# Patient Record
(demographics unavailable — no encounter records)

---

## 2025-06-18 NOTE — HISTORY OF PRESENT ILLNESS
[FreeTextEntry1] : LEROY MATUTE Oct 19 1956   Language: English Date of First visit: 06/18/2025 Accompanied by: self Contact info: Referring Provider/PCP: Dr. Ramses Monson Fax: [phone]   CC/ Problem List:  LUTs =============================================================================== FIRST VISIT / Summary: Very pleasant 68 year old M here for pain in bladder if hold urine too long and urinary urgency with some leakage. Daytime frequency 6-8x and nocturia 3x. Drinks 2-3 cups water daily, 1 cup coffee, sometimes spicy, eats acidic foods, eats citrus foods. very rarely carbonated beverages. Snores no leg swelling  Denies hematuria or dysuria or constipation  IPSS QOL  ------------------------------------------------------------------------------------------- INTERVAL VISITS:   ===============================================================================   PMH: DM, HTN  FHx: no  malignancies  SocHx: non smoker, no Etoh, ,    PSH: none    ROS: Review of Systems is as per HPI unless otherwise denoted below   =============================================================================== DATA: LABS (SELECTED):---------------------------------------------------------------------------------------------------  6/9/2025 Cr 0.96, A1c 6.3, PSA 0.90   RADS:------------------------------------------------------------------------------------------------------------------- 6/11/2025    PATHOLOGY/CYTOLOGY:-------------------------------------------------------------------------------------------     VOIDING STUDIES: ----------------------------------------------------------------------------------------------------     STONE STUDIES: (Analysis/LLSA)----------------------------------------------------------------------------------     PROCEDURES: -----------------------------------------------------------------------------------------------       =============================================================================== PHYSICAL EXAM:    FOCUSED: ---------------------------------------------------------------------------------------------------------------- 06/18/2025 TOBY declined   ======================================================================================= DISCUSSION: ======================================================================================= ASSESSMENT and PLAN   1. LUTs - increase hydration to 2-3L daily - avoid bladder irritants - prescribed Tamsulosin, SE discussed  - uroflow at next visit  - RTC in 2-3 months - traveling july 14 for 3 weeks   2. snores -home sleep test ordered   =======================================================================================  4g Thank you for allowing me to assist in the care of your patient. Should you have any questions please do not hesitate to reach out to me.     Larry Hodge MD                                                       [facility] Physician [facility]   [place] Office: [address], [address] [place] [address] T: [phone] F: [phone]   [place] Office: [address] [place] [address] T: [phone] F: [phone]

## 2025-06-18 NOTE — HISTORY OF PRESENT ILLNESS
[FreeTextEntry1] : LEROY MATUTE Oct 19 1956   Language: English Date of First visit: 06/18/2025 Accompanied by: self Contact info: Referring Provider/PCP: Dr. Ramses Monson Fax: [phone]   CC/ Problem List:  LUTs =============================================================================== FIRST VISIT / Summary: Very pleasant 68 year old M here for pain in bladder if hold urine too long and urinary urgency with some leakage. Daytime frequency 6-8x and nocturia 3x. Drinks 2-3 cups water daily, 1 cup coffee, sometimes spicy, eats acidic foods, eats citrus foods. very rarely carbonated beverages. Snores no leg swelling  Denies hematuria or dysuria or constipation  IPSS QOL  ------------------------------------------------------------------------------------------- INTERVAL VISITS:   ===============================================================================   PMH: DM, HTN  FHx: no  malignancies  SocHx: non smoker, no Etoh, ,    PSH: none    ROS: Review of Systems is as per HPI unless otherwise denoted below   =============================================================================== DATA: LABS (SELECTED):---------------------------------------------------------------------------------------------------  6/9/2025 Cr 0.96, A1c 6.3, PSA 0.90   RADS:------------------------------------------------------------------------------------------------------------------- 6/11/2025    PATHOLOGY/CYTOLOGY:-------------------------------------------------------------------------------------------     VOIDING STUDIES: ----------------------------------------------------------------------------------------------------     STONE STUDIES: (Analysis/LLSA)----------------------------------------------------------------------------------     PROCEDURES: -----------------------------------------------------------------------------------------------       =============================================================================== PHYSICAL EXAM:    FOCUSED: ---------------------------------------------------------------------------------------------------------------- 06/18/2025 TOBY declined   ======================================================================================= DISCUSSION: ======================================================================================= ASSESSMENT and PLAN   1. LUTs - increase hydration to 2-3L daily - avoid bladder irritants - prescribed Tamsulosin, SE discussed  - uroflow at next visit  - RTC in 2-3 months - traveling july 14 for 3 weeks   2. snores -home sleep test ordered   =======================================================================================  4g Thank you for allowing me to assist in the care of your patient. Should you have any questions please do not hesitate to reach out to me.     Larry Hodge MD                                                       [facility] Physician [facility]   [place] Office: [address], [address] [place] [address] T: [phone] F: [phone]   [place] Office: [address] [place] [address] T: [phone] F: [phone]